# Patient Record
Sex: FEMALE | Race: BLACK OR AFRICAN AMERICAN | NOT HISPANIC OR LATINO | Employment: FULL TIME | URBAN - METROPOLITAN AREA
[De-identification: names, ages, dates, MRNs, and addresses within clinical notes are randomized per-mention and may not be internally consistent; named-entity substitution may affect disease eponyms.]

---

## 2023-09-15 ENCOUNTER — LAB (OUTPATIENT)
Dept: LAB | Facility: HOSPITAL | Age: 43
End: 2023-09-15
Payer: MEDICAID

## 2023-09-15 ENCOUNTER — OFFICE VISIT (OUTPATIENT)
Dept: FAMILY MEDICINE CLINIC | Facility: CLINIC | Age: 43
End: 2023-09-15
Payer: MEDICAID

## 2023-09-15 VITALS
OXYGEN SATURATION: 98 % | WEIGHT: 191 LBS | BODY MASS INDEX: 28.95 KG/M2 | HEART RATE: 64 BPM | SYSTOLIC BLOOD PRESSURE: 108 MMHG | DIASTOLIC BLOOD PRESSURE: 62 MMHG | HEIGHT: 68 IN

## 2023-09-15 DIAGNOSIS — Z13.0 SCREENING FOR DEFICIENCY ANEMIA: ICD-10-CM

## 2023-09-15 DIAGNOSIS — Z00.00 PHYSICAL EXAM, ANNUAL: Primary | ICD-10-CM

## 2023-09-15 DIAGNOSIS — Z13.1 SCREENING FOR DIABETES MELLITUS: ICD-10-CM

## 2023-09-15 DIAGNOSIS — Z00.00 PHYSICAL EXAM, ANNUAL: ICD-10-CM

## 2023-09-15 DIAGNOSIS — Z13.220 SCREENING FOR CHOLESTEROL LEVEL: ICD-10-CM

## 2023-09-15 DIAGNOSIS — Z13.29 SCREENING FOR THYROID DISORDER: ICD-10-CM

## 2023-09-15 LAB
ALBUMIN SERPL-MCNC: 4.6 G/DL (ref 3.5–5.2)
ALBUMIN/GLOB SERPL: 1.9 G/DL
ALP SERPL-CCNC: 57 U/L (ref 39–117)
ALT SERPL W P-5'-P-CCNC: 13 U/L (ref 1–33)
ANION GAP SERPL CALCULATED.3IONS-SCNC: 9 MMOL/L (ref 5–15)
AST SERPL-CCNC: 17 U/L (ref 1–32)
BASOPHILS # BLD AUTO: 0.03 10*3/MM3 (ref 0–0.2)
BASOPHILS NFR BLD AUTO: 0.7 % (ref 0–1.5)
BILIRUB SERPL-MCNC: 0.4 MG/DL (ref 0–1.2)
BUN SERPL-MCNC: 11 MG/DL (ref 6–20)
BUN/CREAT SERPL: 13.8 (ref 7–25)
CALCIUM SPEC-SCNC: 9.3 MG/DL (ref 8.6–10.5)
CHLORIDE SERPL-SCNC: 105 MMOL/L (ref 98–107)
CHOLEST SERPL-MCNC: 162 MG/DL (ref 0–200)
CO2 SERPL-SCNC: 25 MMOL/L (ref 22–29)
CREAT SERPL-MCNC: 0.8 MG/DL (ref 0.57–1)
DEPRECATED RDW RBC AUTO: 41.8 FL (ref 37–54)
EGFRCR SERPLBLD CKD-EPI 2021: 93.3 ML/MIN/1.73
EOSINOPHIL # BLD AUTO: 0.14 10*3/MM3 (ref 0–0.4)
EOSINOPHIL NFR BLD AUTO: 3 % (ref 0.3–6.2)
ERYTHROCYTE [DISTWIDTH] IN BLOOD BY AUTOMATED COUNT: 13 % (ref 12.3–15.4)
GLOBULIN UR ELPH-MCNC: 2.4 GM/DL
GLUCOSE SERPL-MCNC: 93 MG/DL (ref 65–99)
HCT VFR BLD AUTO: 37.8 % (ref 34–46.6)
HDLC SERPL-MCNC: 47 MG/DL (ref 40–60)
HGB BLD-MCNC: 12.8 G/DL (ref 12–15.9)
IMM GRANULOCYTES # BLD AUTO: 0 10*3/MM3 (ref 0–0.05)
IMM GRANULOCYTES NFR BLD AUTO: 0 % (ref 0–0.5)
LDLC SERPL CALC-MCNC: 106 MG/DL (ref 0–100)
LDLC/HDLC SERPL: 2.26 {RATIO}
LYMPHOCYTES # BLD AUTO: 2.5 10*3/MM3 (ref 0.7–3.1)
LYMPHOCYTES NFR BLD AUTO: 54.3 % (ref 19.6–45.3)
MCH RBC QN AUTO: 29.9 PG (ref 26.6–33)
MCHC RBC AUTO-ENTMCNC: 33.9 G/DL (ref 31.5–35.7)
MCV RBC AUTO: 88.3 FL (ref 79–97)
MONOCYTES # BLD AUTO: 0.47 10*3/MM3 (ref 0.1–0.9)
MONOCYTES NFR BLD AUTO: 10.2 % (ref 5–12)
NEUTROPHILS NFR BLD AUTO: 1.46 10*3/MM3 (ref 1.7–7)
NEUTROPHILS NFR BLD AUTO: 31.8 % (ref 42.7–76)
NRBC BLD AUTO-RTO: 0.2 /100 WBC (ref 0–0.2)
PLATELET # BLD AUTO: 200 10*3/MM3 (ref 140–450)
PMV BLD AUTO: 11.7 FL (ref 6–12)
POTASSIUM SERPL-SCNC: 4.4 MMOL/L (ref 3.5–5.2)
PROT SERPL-MCNC: 7 G/DL (ref 6–8.5)
RBC # BLD AUTO: 4.28 10*6/MM3 (ref 3.77–5.28)
SODIUM SERPL-SCNC: 139 MMOL/L (ref 136–145)
TRIGL SERPL-MCNC: 44 MG/DL (ref 0–150)
TSH SERPL DL<=0.05 MIU/L-ACNC: 2.95 UIU/ML (ref 0.27–4.2)
VLDLC SERPL-MCNC: 9 MG/DL (ref 5–40)
WBC NRBC COR # BLD: 4.6 10*3/MM3 (ref 3.4–10.8)

## 2023-09-15 PROCEDURE — 1160F RVW MEDS BY RX/DR IN RCRD: CPT | Performed by: PHYSICIAN ASSISTANT

## 2023-09-15 PROCEDURE — 1159F MED LIST DOCD IN RCRD: CPT | Performed by: PHYSICIAN ASSISTANT

## 2023-09-15 PROCEDURE — 80061 LIPID PANEL: CPT

## 2023-09-15 PROCEDURE — 99396 PREV VISIT EST AGE 40-64: CPT | Performed by: PHYSICIAN ASSISTANT

## 2023-09-15 PROCEDURE — 80050 GENERAL HEALTH PANEL: CPT

## 2023-09-15 NOTE — PROGRESS NOTES
Chief Complaint   Patient presents with    Annual Exam       Lupe Herman is a pleasant 43 y.o. female who is here for annual physical exam.  Patient denies any concerns or complaints today.  She is not on any medications currently.  Takes prenatal vitamin.  She refuses pap smear, mammogram or colonoscopy.  She denies any moles or lesions of concern.  Denies vision issues.  Not going to dentist, brushing teeth regularly.     is present at appointment.      History reviewed. No pertinent past medical history.    History reviewed. No pertinent surgical history.    History reviewed. No pertinent family history.    Social History     Socioeconomic History    Marital status:    Tobacco Use    Smoking status: Never    Smokeless tobacco: Never   Vaping Use    Vaping Use: Never used   Substance and Sexual Activity    Alcohol use: Yes     Alcohol/week: 1.0 standard drink     Types: 1 Glasses of wine per week    Drug use: Never    Sexual activity: Yes     Partners: Male       No Known Allergies    ROS  Review of Systems   Constitutional:  Positive for fatigue. Negative for chills, diaphoresis and fever.   HENT:  Negative for congestion, ear pain, hearing loss, postnasal drip, rhinorrhea and sore throat.    Eyes:  Negative for blurred vision and pain.   Respiratory:  Negative for cough, shortness of breath and wheezing.    Cardiovascular:  Negative for chest pain and leg swelling.   Gastrointestinal:  Negative for abdominal pain, blood in stool, constipation, diarrhea, nausea, vomiting and indigestion.   Endocrine: Negative for polyuria.   Genitourinary:  Negative for dysuria, flank pain and hematuria.   Musculoskeletal:  Negative for arthralgias, gait problem and myalgias.   Skin:  Negative for rash and skin lesions.   Neurological:  Negative for dizziness and headache.   Psychiatric/Behavioral:  Negative for self-injury, sleep disturbance, suicidal ideas, depressed mood and stress. The patient is not  "nervous/anxious.      Vitals:    09/15/23 1047   BP: 108/62   Pulse: 64   SpO2: 98%   Weight: 86.6 kg (191 lb)   Height: 172.7 cm (67.99\")     Body mass index is 29.05 kg/m².    BMI is >= 25 and <30. (Overweight) The following options were offered after discussion;: exercise counseling/recommendations and nutrition counseling/recommendations       Current Outpatient Medications on File Prior to Visit   Medication Sig Dispense Refill    acetaminophen (TYLENOL) 500 MG tablet Take 2 tablets by mouth Every 8 (Eight) Hours As Needed for Moderate Pain. 180 tablet 1    ibuprofen (ADVIL,MOTRIN) 800 MG tablet Take 1 tablet by mouth Every 8 (Eight) Hours As Needed for Moderate Pain. 90 tablet 0     No current facility-administered medications on file prior to visit.       Results for orders placed or performed in visit on 11/21/22   NuSwab VG+ - Swab, Vagina    Specimen: Vagina; Swab   Result Value Ref Range    Atopobium Vaginae Low - 0 Score    BVAB 2 Low - 0 Score    Megasphaera 1 Low - 0 Score    Candida Albicans, CARRIE Negative Negative    Candida Glabrata, CARRIE Negative Negative    Trichomonas vaginosis Negative Negative    Chlamydia trachomatis, CARRIE Negative Negative    Neisseria gonorrhoeae, CARRIE Negative Negative   POCT SARS-CoV-2 Antigen BRIAN + Flu    Specimen: Swab   Result Value Ref Range    SARS Antigen Not Detected Not Detected, Presumptive Negative    Influenza A Antigen BRIAN Not Detected Not Detected    Influenza B Antigen BRIAN Not Detected Not Detected    Internal Control Passed Passed    Lot Number [id number]     Expiration Date 06/01/23        PE    Physical Exam  Vitals reviewed.   Constitutional:       General: She is not in acute distress.     Appearance: Normal appearance. She is well-developed and normal weight. She is not ill-appearing or diaphoretic.   HENT:      Head: Normocephalic and atraumatic.      Right Ear: Hearing, tympanic membrane, ear canal and external ear normal.      Left Ear: Hearing, tympanic " membrane, ear canal and external ear normal.      Nose: Nose normal.      Right Sinus: No maxillary sinus tenderness or frontal sinus tenderness.      Left Sinus: No maxillary sinus tenderness or frontal sinus tenderness.      Mouth/Throat:      Pharynx: Uvula midline.   Eyes:      General: Lids are normal.      Extraocular Movements: Extraocular movements intact.      Conjunctiva/sclera: Conjunctivae normal.   Neck:      Thyroid: No thyroid mass or thyromegaly.      Trachea: Trachea and phonation normal.   Cardiovascular:      Rate and Rhythm: Normal rate and regular rhythm.      Heart sounds: Normal heart sounds.   Pulmonary:      Effort: Pulmonary effort is normal.      Breath sounds: Normal breath sounds.   Abdominal:      General: Bowel sounds are normal. There is no distension.      Palpations: Abdomen is soft. Abdomen is not rigid.      Tenderness: There is no abdominal tenderness. There is no guarding.   Musculoskeletal:         General: Normal range of motion.      Cervical back: Normal range of motion.      Right lower leg: No edema.      Left lower leg: No edema.   Lymphadenopathy:      Cervical: No cervical adenopathy.      Right cervical: No superficial cervical adenopathy.     Left cervical: No superficial cervical adenopathy.   Skin:     General: Skin is warm.      Findings: No erythema or rash.      Nails: There is no clubbing.   Neurological:      Mental Status: She is alert and oriented to person, place, and time.      Coordination: Coordination normal.      Gait: Gait normal.      Deep Tendon Reflexes: Reflexes are normal and symmetric.      Comments: CN grossly intact   Psychiatric:         Attention and Perception: Attention and perception normal. She is attentive.         Mood and Affect: Mood and affect normal.         Speech: Speech normal.         Behavior: Behavior normal. Behavior is cooperative.         Thought Content: Thought content normal.         Cognition and Memory: Cognition and  memory normal.         Judgment: Judgment normal.       A/P    Diagnoses and all orders for this visit:    1. Physical exam, annual (Primary)  -     CBC Auto Differential; Future  -     Comprehensive Metabolic Panel; Future  -     TSH Rfx On Abnormal To Free T4; Future  -     Lipid Panel; Future  PE completed  Preventative labs ordered  Colonoscopy - refused  Mammogram - refused  Gynecologist - pap smear refused  Dentist - not going, brushes teeth at home  Ophthalmologist - denies vision issues  Dermatologist - denies moles or lesions of concern  Vaccinations discussed    2. Screening for deficiency anemia  -     CBC Auto Differential; Future    3. Screening for diabetes mellitus  -     Comprehensive Metabolic Panel; Future    4. Screening for thyroid disorder  -     TSH Rfx On Abnormal To Free T4; Future    5. Screening for cholesterol level  -     Lipid Panel; Future         Plan of care reviewed with patient at the conclusion of today's visit. Education was provided regarding nutrition , exercise, supplements, preventative screenings, and vaccinations diagnosis, management and any prescribed or recommended OTC medications.  Patient verbalizes understanding of and agreement with management plan.    Dictated Utilizing Dragon Dictation     Please note that portions of this note were completed with a voice recognition program.     Part of this note may be an electronic transcription/translation of spoken language to printed text using the Dragon Dictation System.    Return in about 1 year (around 9/15/2024) for Annual physical.     Ava Oliver PA-C

## 2024-03-28 ENCOUNTER — LAB (OUTPATIENT)
Dept: LAB | Facility: HOSPITAL | Age: 44
End: 2024-03-28
Payer: MEDICAID

## 2024-03-28 ENCOUNTER — OFFICE VISIT (OUTPATIENT)
Dept: FAMILY MEDICINE CLINIC | Facility: CLINIC | Age: 44
End: 2024-03-28
Payer: MEDICAID

## 2024-03-28 VITALS
OXYGEN SATURATION: 100 % | HEART RATE: 74 BPM | WEIGHT: 197.4 LBS | DIASTOLIC BLOOD PRESSURE: 62 MMHG | HEIGHT: 68 IN | BODY MASS INDEX: 29.92 KG/M2 | SYSTOLIC BLOOD PRESSURE: 104 MMHG

## 2024-03-28 DIAGNOSIS — R11.0 NAUSEA: ICD-10-CM

## 2024-03-28 DIAGNOSIS — Z34.90 PREGNANCY, UNSPECIFIED GESTATIONAL AGE: ICD-10-CM

## 2024-03-28 DIAGNOSIS — Z32.00 POSSIBLE PREGNANCY: Primary | ICD-10-CM

## 2024-03-28 LAB
B-HCG UR QL: POSITIVE
EXPIRATION DATE: ABNORMAL
HCG INTACT+B SERPL-ACNC: NORMAL MIU/ML
INTERNAL NEGATIVE CONTROL: ABNORMAL
INTERNAL POSITIVE CONTROL: ABNORMAL
Lab: ABNORMAL

## 2024-03-28 PROCEDURE — 36415 COLL VENOUS BLD VENIPUNCTURE: CPT

## 2024-03-28 PROCEDURE — 84702 CHORIONIC GONADOTROPIN TEST: CPT

## 2024-03-28 NOTE — PROGRESS NOTES
"Manda Schaefer  1980  [id number]  Patient Care Team:  Pramod Dorantes MD as PCP - General (Internal Medicine)    Manda Schaefer is a 43 y.o. female here today for follow up.     This patient is accompanied by their  who contributes to the history of their care.    Chief Complaint:    Chief Complaint   Patient presents with    Nausea     AT home pregnancy test came back positive    Fatigue    Possible Pregnancy        History of Present Illness:  I have reviewed and/or updated the patient's past medical, past surgical, family, social history, problem list and allergies as appropriate.     Her to transfer care. Her LMP was Feb 7th. She reports nausea but no vomiting.  Took a pregnancy test at home and it was positive.  Denies any cramping or bleeding , fevers or chills. She denies any abdominal pain. She has 4 previous pregnancies with normal delivery.this would be her 5th pregnancy- no miscarriages. Her last delivey was March 25 2018- ( [place])- she was followed at hat time by ob. She would like to be referred to OB. She currntly takes no medications.  She indicates her nausea is manageable.      Review of Systems   Constitutional:  Positive for fatigue.   Eyes: Negative.    Respiratory: Negative.     Cardiovascular: Negative.    Gastrointestinal:  Positive for nausea.   Genitourinary:  Positive for amenorrhea.   Musculoskeletal: Negative.    Skin: Negative.        Vitals:    03/28/24 0922   BP: 104/62   Pulse: 74   SpO2: 100%   Weight: 89.5 kg (197 lb 6.4 oz)   Height: 172.7 cm (67.99\")     Body mass index is 30.02 kg/m².    Physical Exam  Vitals and nursing note reviewed.   Constitutional:       General: She is not in acute distress.     Appearance: She is well-developed. She is not diaphoretic.   HENT:      Head: Normocephalic and atraumatic.      Right Ear: External ear normal.      Left Ear: External ear normal.      Mouth/Throat:      Pharynx: No oropharyngeal exudate.   Eyes:      General: No " scleral icterus.        Right eye: No discharge.      Conjunctiva/sclera: Conjunctivae normal.   Neck:      Thyroid: No thyromegaly.      Vascular: No JVD.      Trachea: No tracheal deviation.   Cardiovascular:      Rate and Rhythm: Normal rate and regular rhythm.      Heart sounds: Normal heart sounds.      Comments: PMI nondisplaced  Pulmonary:      Effort: Pulmonary effort is normal.      Breath sounds: Normal breath sounds. No wheezing or rales.   Abdominal:      General: Bowel sounds are normal.      Palpations: Abdomen is soft.      Tenderness: There is no abdominal tenderness. There is no guarding or rebound.   Musculoskeletal:      Cervical back: Normal range of motion and neck supple.   Lymphadenopathy:      Cervical: No cervical adenopathy.   Skin:     General: Skin is warm and dry.      Capillary Refill: Capillary refill takes less than 2 seconds.      Coloration: Skin is not pale.      Findings: No rash.   Neurological:      Mental Status: She is alert and oriented to person, place, and time.      Motor: No abnormal muscle tone.      Coordination: Coordination normal.   Psychiatric:         Judgment: Judgment normal.         Procedures    Results Review:    I reviewed the patient's new clinical results.  f Range & Units 09:42  (3/28/24) 1 yr ago  (11/21/22) 1 yr ago  (4/15/22) 1 yr ago  (4/15/22)   HCG, Urine, QL  Negative Positive Abnormal    Negative   Lot Number [id number]   Internal Positive Control  Positive, Passed Passed   Positive   Internal Negative Control  Negative, Passed Passed   Negative   Expiration Date 03/11/2025 06/01/23       Assessment/Plan:    Problem List Items Addressed This Visit    None  Visit Diagnoses       Possible pregnancy    -  Primary    Relevant Orders    POC Pregnancy, Urine (Completed)    Nausea        Relevant Orders    hCG, Quantitative, Pregnancy    Pregnancy, unspecified gestational age        Relevant Orders    hCG, Quantitative,  Pregnancy    Ambulatory Referral to Obstetrics / Gynecology        I have referred her for first available OB for likely high risk given patient age 43 for longitudinal care, requests beta-hCG as well.  Avoid any over-the-counter medicines.    Plan of care reviewed with patient at the conclusion of today's visit. Education was provided regarding diagnosis and management.  Patient verbalizes understanding of and agreement with management plan.    No follow-ups on file.    Pramod Dorantes MD      Please note than portions of this note were completed wt a Voice Recognition Program

## 2024-04-27 ENCOUNTER — APPOINTMENT (OUTPATIENT)
Dept: ULTRASOUND IMAGING | Facility: HOSPITAL | Age: 44
End: 2024-04-27
Payer: MEDICAID

## 2024-04-27 ENCOUNTER — HOSPITAL ENCOUNTER (EMERGENCY)
Facility: HOSPITAL | Age: 44
Discharge: HOME | End: 2024-04-27
Attending: EMERGENCY MEDICINE
Payer: MEDICAID

## 2024-04-27 VITALS
OXYGEN SATURATION: 100 % | DIASTOLIC BLOOD PRESSURE: 86 MMHG | TEMPERATURE: 99 F | WEIGHT: 197.31 LBS | SYSTOLIC BLOOD PRESSURE: 102 MMHG | HEIGHT: 68 IN | HEART RATE: 70 BPM | RESPIRATION RATE: 16 BRPM | BODY MASS INDEX: 29.9 KG/M2

## 2024-04-27 DIAGNOSIS — O20.8 SUBCHORIONIC HEMORRHAGE OF PLACENTA IN FIRST TRIMESTER: ICD-10-CM

## 2024-04-27 DIAGNOSIS — Z3A.12 12 WEEKS GESTATION OF PREGNANCY: Primary | ICD-10-CM

## 2024-04-27 LAB
ABO GROUP BLD: NORMAL
BASOPHILS # BLD AUTO: 0.02 10*3/MM3 (ref 0–0.2)
BASOPHILS NFR BLD AUTO: 0.4 % (ref 0–1.5)
DEPRECATED RDW RBC AUTO: 38.8 FL (ref 37–54)
EOSINOPHIL # BLD AUTO: 0.14 10*3/MM3 (ref 0–0.4)
EOSINOPHIL NFR BLD AUTO: 3 % (ref 0.3–6.2)
ERYTHROCYTE [DISTWIDTH] IN BLOOD BY AUTOMATED COUNT: 12.1 % (ref 12.3–15.4)
HCG INTACT+B SERPL-ACNC: NORMAL MIU/ML
HCT VFR BLD AUTO: 33.8 % (ref 34–46.6)
HGB BLD-MCNC: 11.3 G/DL (ref 12–15.9)
HOLD SPECIMEN: NORMAL
IMM GRANULOCYTES # BLD AUTO: 0.01 10*3/MM3 (ref 0–0.05)
IMM GRANULOCYTES NFR BLD AUTO: 0.2 % (ref 0–0.5)
LYMPHOCYTES # BLD AUTO: 1.49 10*3/MM3 (ref 0.7–3.1)
LYMPHOCYTES NFR BLD AUTO: 31.6 % (ref 19.6–45.3)
MCH RBC QN AUTO: 29.4 PG (ref 26.6–33)
MCHC RBC AUTO-ENTMCNC: 33.4 G/DL (ref 31.5–35.7)
MCV RBC AUTO: 88 FL (ref 79–97)
MONOCYTES # BLD AUTO: 0.42 10*3/MM3 (ref 0.1–0.9)
MONOCYTES NFR BLD AUTO: 8.9 % (ref 5–12)
NEUTROPHILS NFR BLD AUTO: 2.63 10*3/MM3 (ref 1.7–7)
NEUTROPHILS NFR BLD AUTO: 55.9 % (ref 42.7–76)
NRBC BLD AUTO-RTO: 0 /100 WBC (ref 0–0.2)
NUMBER OF DOSES: NORMAL
PLATELET # BLD AUTO: 156 10*3/MM3 (ref 140–450)
PMV BLD AUTO: 11.2 FL (ref 6–12)
RBC # BLD AUTO: 3.84 10*6/MM3 (ref 3.77–5.28)
RH BLD: POSITIVE
WBC NRBC COR # BLD AUTO: 4.71 10*3/MM3 (ref 3.4–10.8)
WHOLE BLOOD HOLD COAG: NORMAL
WHOLE BLOOD HOLD SPECIMEN: NORMAL

## 2024-04-27 PROCEDURE — 86901 BLOOD TYPING SEROLOGIC RH(D): CPT | Performed by: EMERGENCY MEDICINE

## 2024-04-27 PROCEDURE — 99284 EMERGENCY DEPT VISIT MOD MDM: CPT

## 2024-04-27 PROCEDURE — 84702 CHORIONIC GONADOTROPIN TEST: CPT | Performed by: EMERGENCY MEDICINE

## 2024-04-27 PROCEDURE — 86900 BLOOD TYPING SEROLOGIC ABO: CPT | Performed by: EMERGENCY MEDICINE

## 2024-04-27 PROCEDURE — 76817 TRANSVAGINAL US OBSTETRIC: CPT

## 2024-04-27 PROCEDURE — 36415 COLL VENOUS BLD VENIPUNCTURE: CPT

## 2024-04-27 PROCEDURE — 85025 COMPLETE CBC W/AUTO DIFF WBC: CPT | Performed by: EMERGENCY MEDICINE

## 2024-04-27 RX ORDER — SODIUM CHLORIDE 0.9 % (FLUSH) 0.9 %
10 SYRINGE (ML) INJECTION AS NEEDED
Status: DISCONTINUED | OUTPATIENT
Start: 2024-04-27 | End: 2024-04-27 | Stop reason: HOSPADM

## 2024-04-27 NOTE — ED PROVIDER NOTES
EMERGENCY DEPARTMENT ENCOUNTER    Pt Name: Manda Schaefer  MRN: [id number]  Pt :   1980  Room Number:  RW4/R4  Date of encounter:  2024  PCP: Pramod Dorantes MD  ED Provider: HANG Gannon    Historian: Patient    HPI:  Chief Complaint: Vaginal bleeding early pregnancy    Context: Manda Schaefer is a 44 y.o. female who presents to the ED c/o vaginal bleeding early pregnancy patient is approximately 6 weeks pregnant.  She is a G5, P4.  She reports approximately 4 hours ago she began to have vaginal bleeding.  She is followed by Dr. Block.  She denies any abdominal pain, cramping.  Negative for nausea, vomiting.  HPI     REVIEW OF SYSTEMS  A chief complaint appropriate review of systems was completed and is negative except as noted in the HPI.     PAST MEDICAL HISTORY  History reviewed. No pertinent past medical history.    PAST SURGICAL HISTORY  History reviewed. No pertinent surgical history.    FAMILY HISTORY  History reviewed. No pertinent family history.    SOCIAL HISTORY  Social History     Socioeconomic History    Marital status:    Tobacco Use    Smoking status: Never    Smokeless tobacco: Never   Vaping Use    Vaping status: Never Used   Substance and Sexual Activity    Alcohol use: Yes     Alcohol/week: 1.0 standard drink of alcohol     Types: 1 Glasses of wine per week    Drug use: Never    Sexual activity: Yes     Partners: Male       ALLERGIES  Patient has no known allergies.    PHYSICAL EXAM  Physical Exam  Vitals and nursing note reviewed.   Constitutional:       General: She is not in acute distress.     Appearance: Normal appearance. She is not ill-appearing.   HENT:      Head: Normocephalic and atraumatic.      Nose: Nose normal.      Mouth/Throat:      Mouth: Mucous membranes are moist.   Eyes:      Extraocular Movements: Extraocular movements intact.      Pupils: Pupils are equal, round, and reactive to light.   Cardiovascular:      Rate and Rhythm: Normal rate and  regular rhythm.      Pulses: Normal pulses.      Heart sounds: Normal heart sounds.   Pulmonary:      Effort: Pulmonary effort is normal.      Breath sounds: Normal breath sounds.   Abdominal:      General: Bowel sounds are normal. There is no distension.      Tenderness: There is no abdominal tenderness.   Musculoskeletal:         General: Normal range of motion.      Cervical back: Normal range of motion and neck supple.   Skin:     General: Skin is warm and dry.   Neurological:      General: No focal deficit present.      Mental Status: She is alert and oriented to person, place, and time.   Psychiatric:         Mood and Affect: Mood normal.         Behavior: Behavior normal.           LAB RESULTS  Results for orders placed or performed during the hospital encounter of 24   hCG, Quantitative, Pregnancy    Specimen: Blood   Result Value Ref Range    HCG Quantitative 65,548.00 mIU/mL   CBC Auto Differential    Specimen: Blood   Result Value Ref Range    WBC 4.71 3.40 - 10.80 10*3/mm3    RBC 3.84 3.77 - 5.28 10*6/mm3    Hemoglobin 11.3 (L) 12.0 - 15.9 g/dL    Hematocrit 33.8 (L) 34.0 - 46.6 %    MCV 88.0 79.0 - 97.0 fL    MCH 29.4 26.6 - 33.0 pg    MCHC 33.4 31.5 - 35.7 g/dL    RDW 12.1 (L) 12.3 - 15.4 %    RDW-SD 38.8 37.0 - 54.0 fl    MPV 11.2 6.0 - 12.0 fL    Platelets 156 140 - 450 10*3/mm3    Neutrophil % 55.9 42.7 - 76.0 %    Lymphocyte % 31.6 19.6 - 45.3 %    Monocyte % 8.9 5.0 - 12.0 %    Eosinophil % 3.0 0.3 - 6.2 %    Basophil % 0.4 0.0 - 1.5 %    Immature Grans % 0.2 0.0 - 0.5 %    Neutrophils, Absolute 2.63 1.70 - 7.00 10*3/mm3    Lymphocytes, Absolute 1.49 0.70 - 3.10 10*3/mm3    Monocytes, Absolute 0.42 0.10 - 0.90 10*3/mm3    Eosinophils, Absolute 0.14 0.00 - 0.40 10*3/mm3    Basophils, Absolute 0.02 0.00 - 0.20 10*3/mm3    Immature Grans, Absolute 0.01 0.00 - 0.05 10*3/mm3    nRBC 0.0 0.0 - 0.2 /100 WBC    RhIg Evaluation    Specimen: Blood   Result Value Ref Range    ABO Type B     RH  type Positive    Doses of Rh Immune Globulin    Specimen: Blood   Result Value Ref Range    Number of Doses       RhIg is not indicated due to the patient's Rh status   Green Top (Gel)   Result Value Ref Range    Extra Tube Hold for add-ons.    Lavender Top   Result Value Ref Range    Extra Tube hold for add-on    Gold Top - SST   Result Value Ref Range    Extra Tube Hold for add-ons.    Gray Top   Result Value Ref Range    Extra Tube Hold for add-ons.    Light Blue Top   Result Value Ref Range    Extra Tube Hold for add-ons.        If labs were ordered, I independently reviewed the results and considered them in treating the patient.    RADIOLOGY  US Ob Transvaginal   Final Result   Impression:   1.Single intrauterine pregnancy with heart rate of 153 bpm.   2.Suspected small subchorionic hemorrhage. No other definite acute complication was seen.   3.Ovaries could not be visualized due to bowel gas.            Electronically Signed: Frankie Phoenix     4/27/2024 4:23 PM EDT     Workstation ID: [id number]        [x] Radiologist's Report Reviewed:  I ordered and independently interpreted the above noted radiographic studies.  See radiologist's dictation for official interpretation.      PROCEDURES    Procedures    No orders to display       MEDICATIONS GIVEN IN ER    Medications   sodium chloride 0.9 % flush 10 mL (has no administration in time range)       MEDICAL DECISION MAKING, PROGRESS, and CONSULTS   Medical Decision Making  Manda Schaefer is a 44 y.o. female who presents to the ED c/o vaginal bleeding early pregnancy patient is approximately 6 weeks pregnant.  She is a G5, P4.  She reports approximately 4 hours ago she began to have vaginal bleeding.  She is followed by Dr. Block.  She denies any abdominal pain, cramping.  Negative for nausea, vomiting.      Problems Addressed:  12 weeks gestation of pregnancy: complicated acute illness or injury     Details: Imaging reveals an IUP of 12 weeks and 3 days.  It also  reveals a subchorionic hemorrhage.  Patient has an appointment to see Dr. Block.  Patient and I discussed pelvic rest and follow-up.  Subchorionic hemorrhage of placenta in first trimester: complicated acute illness or injury    Amount and/or Complexity of Data Reviewed  Labs: ordered. Decision-making details documented in ED Course.  Radiology: ordered.    Risk  Prescription drug management.        All labs have been independently reviewed by me.  All radiology studies have been interpreted by me and the radiologist dictating the report.  All EKG's have been independently interpreted by me as well as and overseeing attending physician.    [] Discussed with radiology regarding test interpretation:    Discussion below represents my analysis of pertinent findings related to patient's condition, differential diagnosis, treatment plan and final disposition.    Differential diagnosis:  The differential diagnosis associated with the patient's presentation includes: Miscarriage, threatened miscarriage, subchorionic hemorrhage    Additional sources  Discussed/ obtained information from independent historians:   [x] Spouse  [] Parent  [] Family member  [] Friend  [] EMS   [] Other:  External (non-ED) record review:   [] Inpatient record:   [] Office record:   [] Outpatient record:   [x] Prior Outpatient labs:   [x] Prior Outpatient radiology:   [] Primary Care record:   [] Outside ED record:   [] Other:   Patient's care impacted by:   [] Diabetes  [] Hypertension  [] Hyperlipidemia  [] Hypothyroidism   [] Coronary Artery Disease   [] COPD   [] Cancer   [] Obesity  [] GERD   [] Tobacco Abuse   [] Substance Abuse    [] Anxiety   [] Depression   [x] Other: G5, P4  Care significantly affected by Social Determinants of Health (housing and economic circumstances, unemployment)    [] Yes     [x] No   If yes, Patient's care significantly limited by  Social Determinants of Health including:   [] Inadequate housing   [] Low  income   [] Alcoholism and drug addiction in family   [] Problems related to primary support group   [] Unemployment   [] Problems related to employment   [] Other Social Determinants of Health:     Shared decision making: Shared decision making with patient and spouse we will discharge the family home.  Patient to follow-up with OB/GYN as planned.  Patient to return to the ER as needed.  Patient agrees and verbalized understanding.    Orders placed during this visit:  Orders Placed This Encounter   Procedures    US Ob Transvaginal    [place] Draw    hCG, Quantitative, Pregnancy    CBC Auto Differential    NPO Diet NPO Type: Strict NPO    Undress & Gown    Vital Signs    Orthostatic Blood Pressure    Supplies To Bedside - Notify MD When Ready- Pelvic Cart / Set Up    Pulse Oximetry    Oxygen Therapy- Nasal Cannula; Titrate 1-6 LPM Per SpO2; 90 - 95%     RhIg Evaluation    Doses of Rh Immune Globulin    Insert Peripheral IV    CBC & Differential    Green Top (Gel)    Lavender Top    Gold Top - SST    Gray Top    Light Blue Top       I considered prescription management  with:   [] Pain medication  [] Antiviral  [] Antibiotic   [] Other:   Rationale:  Additional orders considered but not ordered:  The following testing was considered but ultimately not selected after discussion with patient/family:    ED Course:    ED Course as of 24 1707   Sat 2024   1501 Number of Doses: RhIg is not indicated due to the patient's Rh status [KG]   1501 WBC: 4.71 [KG]   1501 RBC: 3.84 [KG]   1501 Hemoglobin(!): 11.3 [KG]   1501 Hematocrit(!): 33.8 [KG]   1548 HCG Quantitative: 65,548.00 [KG]   1630 HCG Quantitative: 65,548.00 [KG]   1631 Impression:  1.Single intrauterine pregnancy with heart rate of 153 bpm.  2.Suspected small subchorionic hemorrhage. No other definite acute complication was seen.  3.Ovaries could not be visualized due to bowel gas.   [KG]      ED Course User Index  [KG] Senia Mahoney,  APRN            DIAGNOSIS  Final diagnoses:   12 weeks gestation of pregnancy   Subchorionic hemorrhage of placenta in first trimester       DISPOSITION    DISCHARGE    Patient discharged in stable condition.    Reviewed implications of results, diagnosis, meds, responsibility to follow up, warning signs and symptoms of possible worsening, potential complications and reasons to return to ER.    Patient/Family voiced understanding of above instructions.    Discussed plan for discharge, as there is no emergent indication for admission.  Pt/family is agreeable and understands need for follow up and possible repeat testing.  Pt/family is aware that discharge does not mean that nothing is wrong but that it indicates no emergency is currently present that requires admission and they must continue care with follow-up as given below or with a physician of their choice.     FOLLOW-UP  Pramod Dorantes MD  [address]  [place] [address]  [phone]          Jose De Jesus Block MD  [address]  [address]  [place] [id number]  [phone]               Medication List      No changes were made to your prescriptions during this visit.          ED Disposition       ED Disposition   Discharge    Condition   Stable    Comment   --               Please note that portions of this document were completed with voice recognition software.       Senia Mahoney, APRN  04/27/24 1706

## 2024-04-27 NOTE — Clinical Note
[facility] EMERGENCY DEPARTMENT  [address]  [facility] [address]  Phone: [phone]    Manda Schaefer was seen and treated in our emergency department on 4/27/2024.  She may return to work on 04/28/2024.  Patient to return on light duty until following up with OB/GYN.  Patient has a subchorionic hemorrhage       Thank you for choosing [facility].    Senia Mahoney, HANG

## 2024-04-29 ENCOUNTER — PATIENT ROUNDING (BHMG ONLY) (OUTPATIENT)
Dept: URGENT CARE | Facility: CLINIC | Age: 44
End: 2024-04-29
Payer: MEDICAID

## 2024-04-29 NOTE — ED NOTES
Thank you for letting us care for you in your recent visit to our urgent care center. We would love to hear about your experience with us. Was this the first time you have visited our location?    We’re always looking for ways to make our patients’ experiences even better. Do you have any recommendations on ways we may improve?     I appreciate you taking the time to respond. Please be on the lookout for a survey about your recent visit from [facility] via text or email. We would greatly appreciate if you could fill that out and turn it back in. We want your voice to be heard and we value your feedback.   Thank you for choosing [facility] for your healthcare needs.

## 2024-05-10 ENCOUNTER — LAB (OUTPATIENT)
Dept: LAB | Facility: HOSPITAL | Age: 44
End: 2024-05-10
Payer: MEDICAID

## 2024-05-10 ENCOUNTER — INITIAL PRENATAL (OUTPATIENT)
Dept: OBSTETRICS AND GYNECOLOGY | Facility: CLINIC | Age: 44
End: 2024-05-10
Payer: MEDICAID

## 2024-05-10 VITALS — SYSTOLIC BLOOD PRESSURE: 120 MMHG | DIASTOLIC BLOOD PRESSURE: 72 MMHG | WEIGHT: 196 LBS | BODY MASS INDEX: 29.81 KG/M2

## 2024-05-10 DIAGNOSIS — O09.529 ANTEPARTUM MULTIGRAVIDA OF ADVANCED MATERNAL AGE: ICD-10-CM

## 2024-05-10 DIAGNOSIS — O09.40 ENCOUNTER FOR SUPERVISION OF HIGH RISK PREGNANCY WITH GRAND MULTIPARITY, ANTEPARTUM: ICD-10-CM

## 2024-05-10 DIAGNOSIS — O09.40 ENCOUNTER FOR SUPERVISION OF HIGH RISK PREGNANCY WITH GRAND MULTIPARITY, ANTEPARTUM: Primary | ICD-10-CM

## 2024-05-10 DIAGNOSIS — Z60.3 LANGUAGE BARRIER, CULTURAL DIFFERENCES: ICD-10-CM

## 2024-05-10 DIAGNOSIS — Z3A.14 14 WEEKS GESTATION OF PREGNANCY: ICD-10-CM

## 2024-05-10 LAB
ABO GROUP BLD: NORMAL
ALBUMIN SERPL-MCNC: 3.9 G/DL (ref 3.5–5.2)
ALBUMIN/GLOB SERPL: 1.3 G/DL
ALP SERPL-CCNC: 48 U/L (ref 39–117)
ALT SERPL W P-5'-P-CCNC: 19 U/L (ref 1–33)
AMPHET+METHAMPHET UR QL: NEGATIVE
AMPHETAMINES UR QL: NEGATIVE
ANION GAP SERPL CALCULATED.3IONS-SCNC: 13.8 MMOL/L (ref 5–15)
AST SERPL-CCNC: 15 U/L (ref 1–32)
BARBITURATES UR QL SCN: NEGATIVE
BASOPHILS # BLD AUTO: 0.02 10*3/MM3 (ref 0–0.2)
BASOPHILS NFR BLD AUTO: 0.4 % (ref 0–1.5)
BENZODIAZ UR QL SCN: NEGATIVE
BILIRUB SERPL-MCNC: 0.3 MG/DL (ref 0–1.2)
BILIRUB UR QL STRIP: NEGATIVE
BLD GP AB SCN SERPL QL: NEGATIVE
BUN SERPL-MCNC: 7 MG/DL (ref 6–20)
BUN/CREAT SERPL: 10.3 (ref 7–25)
BUPRENORPHINE SERPL-MCNC: NEGATIVE NG/ML
CALCIUM SPEC-SCNC: 9 MG/DL (ref 8.6–10.5)
CANNABINOIDS SERPL QL: NEGATIVE
CHLORIDE SERPL-SCNC: 103 MMOL/L (ref 98–107)
CLARITY UR: ABNORMAL
CO2 SERPL-SCNC: 20.2 MMOL/L (ref 22–29)
COCAINE UR QL: NEGATIVE
COLOR UR: ABNORMAL
CREAT SERPL-MCNC: 0.68 MG/DL (ref 0.57–1)
DEPRECATED RDW RBC AUTO: 39.5 FL (ref 37–54)
EGFRCR SERPLBLD CKD-EPI 2021: 109.6 ML/MIN/1.73
EOSINOPHIL # BLD AUTO: 0.13 10*3/MM3 (ref 0–0.4)
EOSINOPHIL NFR BLD AUTO: 2.7 % (ref 0.3–6.2)
ERYTHROCYTE [DISTWIDTH] IN BLOOD BY AUTOMATED COUNT: 12.5 % (ref 12.3–15.4)
FENTANYL UR-MCNC: NEGATIVE NG/ML
GLOBULIN UR ELPH-MCNC: 2.9 GM/DL
GLUCOSE SERPL-MCNC: 100 MG/DL (ref 65–99)
GLUCOSE UR STRIP-MCNC: NEGATIVE MG/DL
HBA1C MFR BLD: 5.8 % (ref 4.8–5.6)
HBV SURFACE AG SERPL QL IA: NORMAL
HCT VFR BLD AUTO: 32.9 % (ref 34–46.6)
HCV AB SER QL: NORMAL
HGB BLD-MCNC: 11.1 G/DL (ref 12–15.9)
HGB UR QL STRIP.AUTO: NEGATIVE
HIV 1+2 AB+HIV1 P24 AG SERPL QL IA: NORMAL
HOLD SPECIMEN: NORMAL
IMM GRANULOCYTES # BLD AUTO: 0.01 10*3/MM3 (ref 0–0.05)
IMM GRANULOCYTES NFR BLD AUTO: 0.2 % (ref 0–0.5)
KETONES UR QL STRIP: NEGATIVE
LEUKOCYTE ESTERASE UR QL STRIP.AUTO: NEGATIVE
LYMPHOCYTES # BLD AUTO: 1.73 10*3/MM3 (ref 0.7–3.1)
LYMPHOCYTES NFR BLD AUTO: 35.7 % (ref 19.6–45.3)
MCH RBC QN AUTO: 29.8 PG (ref 26.6–33)
MCHC RBC AUTO-ENTMCNC: 33.7 G/DL (ref 31.5–35.7)
MCV RBC AUTO: 88.4 FL (ref 79–97)
METHADONE UR QL SCN: NEGATIVE
MONOCYTES # BLD AUTO: 0.34 10*3/MM3 (ref 0.1–0.9)
MONOCYTES NFR BLD AUTO: 7 % (ref 5–12)
NEUTROPHILS NFR BLD AUTO: 2.62 10*3/MM3 (ref 1.7–7)
NEUTROPHILS NFR BLD AUTO: 54 % (ref 42.7–76)
NITRITE UR QL STRIP: NEGATIVE
NRBC BLD AUTO-RTO: 0 /100 WBC (ref 0–0.2)
OPIATES UR QL: NEGATIVE
OXYCODONE UR QL SCN: NEGATIVE
PCP UR QL SCN: NEGATIVE
PH UR STRIP.AUTO: 5.5 [PH] (ref 5–8)
PLATELET # BLD AUTO: 142 10*3/MM3 (ref 140–450)
PMV BLD AUTO: 11.4 FL (ref 6–12)
POTASSIUM SERPL-SCNC: 3.8 MMOL/L (ref 3.5–5.2)
PROT SERPL-MCNC: 6.8 G/DL (ref 6–8.5)
PROT UR QL STRIP: ABNORMAL
RBC # BLD AUTO: 3.72 10*6/MM3 (ref 3.77–5.28)
RH BLD: POSITIVE
RPR SER QL: NORMAL
SODIUM SERPL-SCNC: 137 MMOL/L (ref 136–145)
SP GR UR STRIP: >1.03 (ref 1–1.03)
TRICYCLICS UR QL SCN: NEGATIVE
TSH SERPL DL<=0.05 MIU/L-ACNC: 2.12 UIU/ML (ref 0.27–4.2)
UROBILINOGEN UR QL STRIP: ABNORMAL
WBC NRBC COR # BLD AUTO: 4.85 10*3/MM3 (ref 3.4–10.8)

## 2024-05-10 PROCEDURE — 86900 BLOOD TYPING SEROLOGIC ABO: CPT

## 2024-05-10 PROCEDURE — G0432 EIA HIV-1/HIV-2 SCREEN: HCPCS

## 2024-05-10 PROCEDURE — 83036 HEMOGLOBIN GLYCOSYLATED A1C: CPT

## 2024-05-10 PROCEDURE — 86850 RBC ANTIBODY SCREEN: CPT

## 2024-05-10 PROCEDURE — 86901 BLOOD TYPING SEROLOGIC RH(D): CPT

## 2024-05-10 PROCEDURE — 86803 HEPATITIS C AB TEST: CPT

## 2024-05-10 PROCEDURE — 86480 TB TEST CELL IMMUN MEASURE: CPT

## 2024-05-10 PROCEDURE — 80050 GENERAL HEALTH PANEL: CPT

## 2024-05-10 PROCEDURE — 81003 URINALYSIS AUTO W/O SCOPE: CPT

## 2024-05-10 PROCEDURE — 86762 RUBELLA ANTIBODY: CPT

## 2024-05-10 PROCEDURE — 86592 SYPHILIS TEST NON-TREP QUAL: CPT

## 2024-05-10 PROCEDURE — 80307 DRUG TEST PRSMV CHEM ANLYZR: CPT

## 2024-05-10 PROCEDURE — 36415 COLL VENOUS BLD VENIPUNCTURE: CPT

## 2024-05-10 PROCEDURE — 87340 HEPATITIS B SURFACE AG IA: CPT

## 2024-05-10 RX ORDER — ASPIRIN 81 MG/1
81 TABLET ORAL DAILY
Qty: 40 TABLET | Refills: 0 | Status: SHIPPED | OUTPATIENT
Start: 2024-05-29 | End: 2024-07-08

## 2024-05-10 RX ORDER — GENTAMICIN SULFATE 3 MG/ML
1 SOLUTION/ DROPS OPHTHALMIC 3 TIMES DAILY
Qty: 15 ML | Refills: 0 | Status: SHIPPED | OUTPATIENT
Start: 2024-05-10

## 2024-05-10 SDOH — SOCIAL STABILITY - SOCIAL INSECURITY: ACCULTURATION DIFFICULTY: Z60.3

## 2024-05-11 LAB — RUBV IGG SERPL IA-ACNC: 12.6 INDEX

## 2024-05-13 PROBLEM — O99.019 MATERNAL ANEMIA IN PREGNANCY, ANTEPARTUM: Status: ACTIVE | Noted: 2024-05-13

## 2024-05-13 RX ORDER — FERROUS SULFATE 325(65) MG
325 TABLET ORAL
Qty: 100 TABLET | Refills: 1 | Status: SHIPPED | OUTPATIENT
Start: 2024-05-13

## 2024-05-13 NOTE — PROGRESS NOTES
Please advise the patient that her labs are generally fine. She is a bit anemic and I have sent a prescription for iron supplement to her pharmacy. Her hemoglobin A1c was a bit high which suggests an increase risk of developing gestational diabetes. We will screen for this as we routinely do at 24 weeks

## 2024-05-14 ENCOUNTER — PATIENT ROUNDING (BHMG ONLY) (OUTPATIENT)
Dept: OBSTETRICS AND GYNECOLOGY | Facility: CLINIC | Age: 44
End: 2024-05-14
Payer: MEDICAID

## 2024-05-14 LAB
GAMMA INTERFERON BACKGROUND BLD IA-ACNC: 0.07 IU/ML
M TB IFN-G BLD-IMP: NEGATIVE
M TB IFN-G CD4+ BCKGRND COR BLD-ACNC: 0.22 IU/ML
M TB IFN-G CD4+CD8+ BCKGRND COR BLD-ACNC: 0.2 IU/ML
MITOGEN IGNF BCKGRD COR BLD-ACNC: >10 IU/ML
SERVICE CMNT-IMP: NORMAL

## 2024-05-14 NOTE — PROGRESS NOTES
A [employer] message has been sent to the patient for PATIENT ROUNDING with [facility].

## 2024-05-22 ENCOUNTER — TELEPHONE (OUTPATIENT)
Dept: OBSTETRICS AND GYNECOLOGY | Facility: CLINIC | Age: 44
End: 2024-05-22
Payer: MEDICAID

## 2024-05-22 NOTE — TELEPHONE ENCOUNTER
Admin Notation - Left voicemail for patient with appointment details for PDC and F/U with provider after. This is of informative nature only.

## 2024-06-19 ENCOUNTER — TELEPHONE (OUTPATIENT)
Dept: OBSTETRICS AND GYNECOLOGY | Facility: CLINIC | Age: 44
End: 2024-06-19
Payer: MEDICAID

## 2024-06-19 NOTE — TELEPHONE ENCOUNTER
Admin Notation - discussed with DAI MENENDEZ and called patient to confirm preferred provider she wished to continue her care with, and to schedule out her remainder appointments. Reached her voicemail, left a brief message regarding and our office phone number.

## 2024-06-19 NOTE — TELEPHONE ENCOUNTER
Admin Notation - called patient to provide her the details of two rescheduled appointments for 6/24/24 and 7/16/24. Reached her voicemail and left a message with our office number. Sent a [email or web address] message also.

## 2024-07-08 ENCOUNTER — TELEPHONE (OUTPATIENT)
Dept: FAMILY MEDICINE CLINIC | Facility: CLINIC | Age: 44
End: 2024-07-08
Payer: MEDICAID

## 2024-07-08 ENCOUNTER — OFFICE VISIT (OUTPATIENT)
Dept: OBSTETRICS AND GYNECOLOGY | Facility: HOSPITAL | Age: 44
End: 2024-07-08
Payer: MEDICAID

## 2024-07-08 ENCOUNTER — HOSPITAL ENCOUNTER (OUTPATIENT)
Dept: WOMENS IMAGING | Facility: HOSPITAL | Age: 44
Discharge: HOME | End: 2024-07-08
Admitting: OBSTETRICS & GYNECOLOGY
Payer: MEDICAID

## 2024-07-08 VITALS — SYSTOLIC BLOOD PRESSURE: 101 MMHG | BODY MASS INDEX: 31.18 KG/M2 | WEIGHT: 205 LBS | DIASTOLIC BLOOD PRESSURE: 58 MMHG

## 2024-07-08 DIAGNOSIS — O09.529 ANTEPARTUM MULTIGRAVIDA OF ADVANCED MATERNAL AGE: Primary | ICD-10-CM

## 2024-07-08 DIAGNOSIS — O09.40 ENCOUNTER FOR SUPERVISION OF HIGH RISK PREGNANCY WITH GRAND MULTIPARITY, ANTEPARTUM: ICD-10-CM

## 2024-07-08 DIAGNOSIS — Z3A.23 23 WEEKS GESTATION OF PREGNANCY: ICD-10-CM

## 2024-07-08 PROCEDURE — 99213 OFFICE O/P EST LOW 20 MIN: CPT | Performed by: OBSTETRICS & GYNECOLOGY

## 2024-07-08 PROCEDURE — 76811 OB US DETAILED SNGL FETUS: CPT | Performed by: OBSTETRICS & GYNECOLOGY

## 2024-07-08 PROCEDURE — 76811 OB US DETAILED SNGL FETUS: CPT

## 2024-07-08 RX ORDER — PRENATAL VIT/IRON FUM/FOLIC AC 27MG-0.8MG
TABLET ORAL DAILY
COMMUNITY

## 2024-07-08 NOTE — PROGRESS NOTES
"    Maternal/Fetal Medicine Consult Note     Name: Manda Schaefer    : 1980     MRN: [id number]     Referring Provider: Cece Carey MD    Chief Complaint  Advanced Maternal Age    Subjective     History of Present Illness:  Manda Schaefer is a 44 y.o.  23w1d who presents today for a fetal anatomic survey.     She denies LOF/VB/ctx's. +FM.     PAM: Estimated Date of Delivery: 11/3/24     ROS:   As noted in HPI.     History reviewed. No pertinent past medical history.   History reviewed. No pertinent surgical history.   OB History          5    Para   4    Term   4            AB        Living   4         SAB        IAB        Ectopic        Molar        Multiple        Live Births   4          Obstetric Comments   FOB #1 for all pregnancies                 Objective     Vital Signs  /58   Wt 93 kg (205 lb)   LMP 2024   Estimated body mass index is 31.18 kg/m² as calculated from the following:    Height as of 24: 172.7 cm (67.99\").    Weight as of this encounter: 93 kg (205 lb).    Physical Exam  Constitutional:       Appearance: Normal appearance. She is normal weight.   HENT:      Head: Normocephalic and atraumatic.   Pulmonary:      Effort: Pulmonary effort is normal.   Musculoskeletal:         General: Normal range of motion.   Neurological:      General: No focal deficit present.      Mental Status: She is alert and oriented to person, place, and time.   Psychiatric:         Mood and Affect: Mood normal.         Behavior: Behavior normal.         Thought Content: Thought content normal.         Judgment: Judgment normal.     Ultrasound Impression:   Vtx, S=D, nl JADIEL, posterior placenta, nl CL, nl anatomy.     Assessment and Plan     Diagnoses and all orders for this visit:    1. Antepartum multigravida of advanced maternal age (Primary)  Assessment & Plan:  Pt declined genetic screening.     Anatomy survey today is within normal limits with all needed views " obtained.      - Follow-up for growth scheduled at 32wks      2. Encounter for supervision of high risk pregnancy with grand multiparity, antepartum    3. 23 weeks gestation of pregnancy         Follow Up  Return in about 9 weeks (around 9/9/2024).    I spent 15 minutes caring for the patient on the day of service. This included: obtaining or reviewing a separately obtained medical history, reviewing patient records, performing a medically appropriate exam and/or evaluation, counseling or educating the patient/family/caregiver, ordering medications, labs, and/or procedures and documenting such in the medical record. This does not include time spent on review and interpretation of other tests such as fetal ultrasound or the performance of other procedures such as amniocentesis or CVS.      Cecile Prince MD  07/08/2024

## 2024-07-08 NOTE — PROGRESS NOTES
Patient denies bleeding, leaking fluid or contractions  NIPT declined  Patients next follow up with Dr. Block's office is today

## 2024-07-08 NOTE — TELEPHONE ENCOUNTER
I explained that we did not place referrals for dentistry. They requested an appointment with Dr Dorantes to discuss oral pain. I have this scheduled.

## 2024-07-08 NOTE — ASSESSMENT & PLAN NOTE
Pt declined genetic screening.     Anatomy survey today is within normal limits with all needed views obtained.      - Follow-up for growth scheduled at 32wks

## 2024-07-08 NOTE — LETTER
"2024     Jose De Jesus Block MD  [address]  [facility] [address]    Patient: Manda Schaefer   YOB: 1980   Date of Visit: 2024       Dear Jose De Jesus Block MD,    Thank you for referring Manda Schaefer to me for evaluation. Below is a copy of my consult note.    If you have questions, please do not hesitate to call me. I look forward to following Manda along with you.         Sincerely,        Cecile Prince MD        CC: No Recipients                    Maternal/Fetal Medicine Consult Note     Name: Manda Schaefer    : 1980     MRN: [id number]     Referring Provider: Cece Carey MD    Chief Complaint  Advanced Maternal Age    Subjective     History of Present Illness:  Manda Schaefer is a 44 y.o.  23w1d who presents today for a fetal anatomic survey.     She denies LOF/VB/ctx's. +FM.     PAM: Estimated Date of Delivery: 11/3/24     ROS:   As noted in HPI.     History reviewed. No pertinent past medical history.   History reviewed. No pertinent surgical history.   OB History          5    Para   4    Term   4            AB        Living   4         SAB        IAB        Ectopic        Molar        Multiple        Live Births   4          Obstetric Comments   FOB #1 for all pregnancies                 Objective     Vital Signs  /58   Wt 93 kg (205 lb)   LMP 2024   Estimated body mass index is 31.18 kg/m² as calculated from the following:    Height as of 24: 172.7 cm (67.99\").    Weight as of this encounter: 93 kg (205 lb).    Physical Exam  Constitutional:       Appearance: Normal appearance. She is normal weight.   HENT:      Head: Normocephalic and atraumatic.   Pulmonary:      Effort: Pulmonary effort is normal.   Musculoskeletal:         General: Normal range of motion.   Neurological:      General: No focal deficit present.      Mental Status: She is alert and oriented to person, place, and time.   Psychiatric:         " Mood and Affect: Mood normal.         Behavior: Behavior normal.         Thought Content: Thought content normal.         Judgment: Judgment normal.     Ultrasound Impression:   Vtx, S=D, nl JADIEL, posterior placenta, nl CL, nl anatomy.     Assessment and Plan     Diagnoses and all orders for this visit:    1. Antepartum multigravida of advanced maternal age (Primary)  Assessment & Plan:  Pt declined genetic screening.     Anatomy survey today is within normal limits with all needed views obtained.      - Follow-up for growth scheduled at 32wks      2. Encounter for supervision of high risk pregnancy with grand multiparity, antepartum    3. 23 weeks gestation of pregnancy         Follow Up  Return in about 9 weeks (around 9/9/2024).    I spent 15 minutes caring for the patient on the day of service. This included: obtaining or reviewing a separately obtained medical history, reviewing patient records, performing a medically appropriate exam and/or evaluation, counseling or educating the patient/family/caregiver, ordering medications, labs, and/or procedures and documenting such in the medical record. This does not include time spent on review and interpretation of other tests such as fetal ultrasound or the performance of other procedures such as amniocentesis or CVS.      Cecile Prince MD  07/08/2024

## 2024-07-08 NOTE — TELEPHONE ENCOUNTER
Caller: Javi Schaefer    Relationship: Emergency Contact    Best call back number: [phone]     What is the medical concern/diagnosis: GUM PROBLEMS    What specialty or service is being requested: DENTIST      Any additional details: PATIENT WANTS DR HALL TO REFER HER TO A DENTIST

## 2024-07-10 ENCOUNTER — OFFICE VISIT (OUTPATIENT)
Dept: FAMILY MEDICINE CLINIC | Facility: CLINIC | Age: 44
End: 2024-07-10
Payer: MEDICAID

## 2024-07-10 VITALS
DIASTOLIC BLOOD PRESSURE: 62 MMHG | OXYGEN SATURATION: 99 % | BODY MASS INDEX: 30.83 KG/M2 | HEART RATE: 76 BPM | WEIGHT: 203.4 LBS | HEIGHT: 68 IN | SYSTOLIC BLOOD PRESSURE: 110 MMHG

## 2024-07-10 DIAGNOSIS — K08.89 TOOTH PAIN: Primary | ICD-10-CM

## 2024-07-10 PROCEDURE — 1126F AMNT PAIN NOTED NONE PRSNT: CPT | Performed by: INTERNAL MEDICINE

## 2024-07-10 PROCEDURE — 99214 OFFICE O/P EST MOD 30 MIN: CPT | Performed by: INTERNAL MEDICINE

## 2024-07-10 RX ORDER — AMOXICILLIN 500 MG/1
500 CAPSULE ORAL 3 TIMES DAILY
Qty: 30 CAPSULE | Refills: 0 | Status: SHIPPED | OUTPATIENT
Start: 2024-07-10

## 2024-07-10 NOTE — PROGRESS NOTES
"Manda Schaefer  1980  [id number]  Patient Care Team:  Pramod Dorantes MD as PCP - General (Internal Medicine)    Manda Schaefer is a 44 y.o. female here today for follow up.     This patient is accompanied by their Answers submitted by the patient for this visit:  Primary Reason for Visit (Submitted on 7/9/2024)  What is the primary reason for your visit?: Extremity Pain  Lower Extremity Injury Questionnaire (Submitted on 7/9/2024)  Chief Complaint: Extremity pain  Injury: No  Pain location: neck  Pain quality: aching  Pain - numeric: 9/10  Progression since onset: unchanged  upper extremity swelling: Yes  Additional information: Upper gums left side of my mouth   who contributes to the history of their care.    Chief Complaint:    Chief Complaint   Patient presents with    Oral Pain        History of Present Illness:  I have reviewed and/or updated the patient's past medical, past surgical, family, social history, problem list and allergies as appropriate.     Reports gums pain upper right side for 3 days. No bleeding. Tylenol has helped. No drainage . Pain with chewing. Currently looking for dentist.  She has history of tooth fracture back in [place] prior to moving here.  There is been no fevers or chills.  No drainage.      Review of Systems   Constitutional: Negative.    HENT:  Positive for dental problem.    Respiratory: Negative.     Cardiovascular: Negative.        Vitals:    07/10/24 1325   BP: 110/62   Pulse: 76   SpO2: 99%   Weight: 92.3 kg (203 lb 6.4 oz)   Height: 172.7 cm (67.99\")     Body mass index is 30.93 kg/m².    Physical Exam  Vitals and nursing note reviewed.   Constitutional:       General: She is not in acute distress.     Appearance: She is well-developed. She is not diaphoretic.   HENT:      Head: Normocephalic and atraumatic.      Right Ear: External ear normal.      Left Ear: External ear normal.      Mouth/Throat:      Pharynx: No oropharyngeal exudate.        Comments: Tooth is " fractured at the gumline.  There is apical edema.  No drainage.  Very tender to palpate.  Eyes:      General: No scleral icterus.        Right eye: No discharge.      Conjunctiva/sclera: Conjunctivae normal.   Neck:      Thyroid: No thyromegaly.      Vascular: No JVD.      Trachea: No tracheal deviation.   Cardiovascular:      Rate and Rhythm: Normal rate and regular rhythm.      Heart sounds: Normal heart sounds.      Comments: PMI nondisplaced  Pulmonary:      Effort: Pulmonary effort is normal.      Breath sounds: Normal breath sounds. No wheezing or rales.   Abdominal:      General: Bowel sounds are normal.      Palpations: Abdomen is soft.      Tenderness: There is no abdominal tenderness. There is no guarding or rebound.   Musculoskeletal:      Cervical back: Normal range of motion and neck supple.   Lymphadenopathy:      Cervical: No cervical adenopathy.   Skin:     General: Skin is warm and dry.      Capillary Refill: Capillary refill takes less than 2 seconds.      Coloration: Skin is not pale.      Findings: No rash.   Neurological:      Mental Status: She is alert and oriented to person, place, and time.      Motor: No abnormal muscle tone.      Coordination: Coordination normal.   Psychiatric:         Judgment: Judgment normal.         Procedures    Results Review:    None    Assessment/Plan:    Problem List Items Addressed This Visit    None  Visit Diagnoses       Tooth pain    -  Primary    Relevant Orders    Ambulatory Referral to Dentistry (Completed)        I suspect she has an abscessed tooth at the fractured tooth.  I recommended she see dentistry ASAP.  Placed her on amoxicillin 100 mg p.o. 3 times daily..  Given the fact that she is pregnant only Tylenol for pain.    Plan of care reviewed with patient at the conclusion of today's visit. Education was provided regarding diagnosis and management.  Patient verbalizes understanding of and agreement with management plan.    No follow-ups on  file.    Pramod Dorantes MD      Please note than portions of this note were completed [facility] a Voice Recognition Program

## 2024-07-15 DIAGNOSIS — O09.529 ANTEPARTUM MULTIGRAVIDA OF ADVANCED MATERNAL AGE: Primary | ICD-10-CM

## 2024-07-16 ENCOUNTER — TELEPHONE (OUTPATIENT)
Dept: OBSTETRICS AND GYNECOLOGY | Facility: CLINIC | Age: 44
End: 2024-07-16

## 2024-07-16 NOTE — TELEPHONE ENCOUNTER
ZANE     Received call from patient and spouse, follow up regarding transfer of care to [address]. [facility] spoke with someone from [address] and are currently awaiting availability for a female provider. I called [address] and spoke with Thuy, whom the call was transferred to and she spoke with the patient as well as her spouse.

## 2024-07-17 ENCOUNTER — TELEPHONE (OUTPATIENT)
Dept: OBSTETRICS AND GYNECOLOGY | Facility: CLINIC | Age: 44
End: 2024-07-17
Payer: MEDICAID

## 2024-08-23 ENCOUNTER — OFFICE VISIT (OUTPATIENT)
Dept: FAMILY MEDICINE CLINIC | Facility: CLINIC | Age: 44
End: 2024-08-23
Payer: MEDICAID

## 2024-08-23 VITALS
DIASTOLIC BLOOD PRESSURE: 68 MMHG | OXYGEN SATURATION: 100 % | BODY MASS INDEX: 30.52 KG/M2 | WEIGHT: 201.4 LBS | HEART RATE: 80 BPM | SYSTOLIC BLOOD PRESSURE: 112 MMHG | HEIGHT: 68 IN

## 2024-08-23 DIAGNOSIS — K64.8 INTERNAL HEMORRHOID: Primary | ICD-10-CM

## 2024-08-23 PROCEDURE — 1126F AMNT PAIN NOTED NONE PRSNT: CPT | Performed by: INTERNAL MEDICINE

## 2024-08-23 PROCEDURE — 99214 OFFICE O/P EST MOD 30 MIN: CPT | Performed by: INTERNAL MEDICINE

## 2024-08-23 RX ORDER — HYDROCORTISONE 25 MG/G
CREAM TOPICAL 2 TIMES DAILY
Qty: 45 G | Refills: 0 | Status: SHIPPED | OUTPATIENT
Start: 2024-08-23

## 2024-08-23 RX ORDER — HYDROCORTISONE ACETATE 25 MG/1
25 SUPPOSITORY RECTAL 2 TIMES DAILY
Qty: 20 EACH | Refills: 1 | Status: SHIPPED | OUTPATIENT
Start: 2024-08-23

## 2024-08-23 NOTE — PROGRESS NOTES
"Manda Schaefer  1980  [id number]  Patient Care Team:  Pramod Dorantes MD as PCP - General (Internal Medicine)    Manda Schaefer is a 44 y.o. female here today for follow up.     This patient is accompanied by their  who contributes to the history of their care.    Chief Complaint:    Chief Complaint   Patient presents with    Hemorrhoids        History of Present Illness:  I have reviewed and/or updated the patient's past medical, past surgical, family, social history, problem list and allergies as appropriate.     She is pregnant, recently referred to maternal-fetal medicine  established will be seen at [facility]. She has noted protrusion of a hemorrhoid 3 days. Has tried equate suppository without improvement.  She is not constipated. No bleeding or itching. Pain can be up to 4 at times. Has been sitting more with pregnancy.        Review of Systems   Constitutional: Negative.    HENT: Negative.     Respiratory: Negative.     Gastrointestinal:  Negative for anal bleeding, blood in stool, nausea and vomiting.        Irritation mild pain protruding hemorrhoid       Vitals:    08/23/24 0857   BP: 112/68   Pulse: 80   SpO2: 100%   Weight: 91.4 kg (201 lb 6.4 oz)   Height: 172.7 cm (67.99\")     Body mass index is 30.63 kg/m².    Physical Exam    Procedures    Results Review:    I reviewed the patient's new clinical results.    Assessment/Plan:    Problem List Items Addressed This Visit    None  Visit Diagnoses       Internal hemorrhoid    -  Primary    Relevant Medications    hydrocortisone (ANUSOL-HC) 25 MG suppository    Hydrocortisone, Perianal, (Anusol-HC) 2.5 % rectal cream    Other Relevant Orders    Cushioned Chair Preinflated    Ambulatory Referral to Colorectal Surgery            Plan of care reviewed with patient at the conclusion of today's visit. Education was provided regarding diagnosis and management.  Patient verbalizes understanding of and agreement with management plan.    Return if " symptoms worsen or fail to improve.  Otherwise follow-up with maternal-fetal medicine    Pramod Dorantes MD      Please note than portions of this note were completed wt a Voice Recognition Program

## 2024-08-26 RX ORDER — AMOXICILLIN 500 MG/1
500 CAPSULE ORAL 3 TIMES DAILY
Qty: 30 CAPSULE | Refills: 0 | OUTPATIENT
Start: 2024-08-26

## 2024-11-18 ENCOUNTER — PATIENT MESSAGE (OUTPATIENT)
Dept: OBSTETRICS AND GYNECOLOGY | Facility: CLINIC | Age: 44
End: 2024-11-18
Payer: MEDICAID

## 2024-11-18 NOTE — TELEPHONE ENCOUNTER
"\"I have never seen this patient nor can I do circumcision... I believe since this baby is not a  she would have to go to urology but I don't know that for sure. Her pediatrician would be better able to offer suggestions.\" Viji Lam   Pt informed and stated understanding.     "

## 2025-08-21 ENCOUNTER — OFFICE VISIT (OUTPATIENT)
Age: 45
End: 2025-08-21
Payer: MEDICAID

## 2025-08-21 ENCOUNTER — LAB (OUTPATIENT)
Age: 45
End: 2025-08-21
Payer: MEDICAID

## 2025-08-21 VITALS
SYSTOLIC BLOOD PRESSURE: 98 MMHG | DIASTOLIC BLOOD PRESSURE: 60 MMHG | BODY MASS INDEX: 31.34 KG/M2 | WEIGHT: 206.8 LBS | HEART RATE: 66 BPM | HEIGHT: 68 IN | OXYGEN SATURATION: 99 %

## 2025-08-21 DIAGNOSIS — Z00.00 PREVENTATIVE HEALTH CARE: ICD-10-CM

## 2025-08-21 DIAGNOSIS — K21.9 GASTROESOPHAGEAL REFLUX DISEASE WITHOUT ESOPHAGITIS: ICD-10-CM

## 2025-08-21 DIAGNOSIS — Z00.00 PREVENTATIVE HEALTH CARE: Primary | ICD-10-CM

## 2025-08-21 LAB — HBA1C MFR BLD: 6.2 % (ref 4.8–5.6)

## 2025-08-21 RX ORDER — PANTOPRAZOLE SODIUM 20 MG/1
20 TABLET, DELAYED RELEASE ORAL DAILY
Qty: 90 TABLET | Refills: 3 | Status: SHIPPED | OUTPATIENT
Start: 2025-08-21

## 2025-08-22 LAB
ALBUMIN SERPL-MCNC: 4.1 G/DL (ref 3.5–5.2)
ALBUMIN/GLOB SERPL: 1.2 G/DL
ALP SERPL-CCNC: 68 U/L (ref 39–117)
ALT SERPL W P-5'-P-CCNC: 15 U/L (ref 1–33)
ANION GAP SERPL CALCULATED.3IONS-SCNC: 12.7 MMOL/L (ref 5–15)
AST SERPL-CCNC: 19 U/L (ref 1–32)
BILIRUB SERPL-MCNC: 0.4 MG/DL (ref 0–1.2)
BUN SERPL-MCNC: 11 MG/DL (ref 6–20)
BUN/CREAT SERPL: 12.2 (ref 7–25)
CALCIUM SPEC-SCNC: 9.3 MG/DL (ref 8.6–10.5)
CHLORIDE SERPL-SCNC: 105 MMOL/L (ref 98–107)
CHOLEST SERPL-MCNC: 169 MG/DL (ref 0–200)
CO2 SERPL-SCNC: 21.3 MMOL/L (ref 22–29)
CREAT SERPL-MCNC: 0.9 MG/DL (ref 0.57–1)
DEPRECATED RDW RBC AUTO: 40 FL (ref 37–54)
EGFRCR SERPLBLD CKD-EPI 2021: 80 ML/MIN/1.73
ERYTHROCYTE [DISTWIDTH] IN BLOOD BY AUTOMATED COUNT: 12.6 % (ref 12.3–15.4)
GLOBULIN UR ELPH-MCNC: 3.5 GM/DL
GLUCOSE SERPL-MCNC: 101 MG/DL (ref 65–99)
HCT VFR BLD AUTO: 39.3 % (ref 34–46.6)
HDLC SERPL-MCNC: 41 MG/DL (ref 40–60)
HGB BLD-MCNC: 12.8 G/DL (ref 12–15.9)
LDLC SERPL CALC-MCNC: 115 MG/DL (ref 0–100)
LDLC/HDLC SERPL: 2.79 {RATIO}
MCH RBC QN AUTO: 28.4 PG (ref 26.6–33)
MCHC RBC AUTO-ENTMCNC: 32.6 G/DL (ref 31.5–35.7)
MCV RBC AUTO: 87.3 FL (ref 79–97)
PLATELET # BLD AUTO: 200 10*3/MM3 (ref 140–450)
PMV BLD AUTO: 11.8 FL (ref 6–12)
POTASSIUM SERPL-SCNC: 4.1 MMOL/L (ref 3.5–5.2)
PROT SERPL-MCNC: 7.6 G/DL (ref 6–8.5)
RBC # BLD AUTO: 4.5 10*6/MM3 (ref 3.77–5.28)
SODIUM SERPL-SCNC: 139 MMOL/L (ref 136–145)
TRIGL SERPL-MCNC: 69 MG/DL (ref 0–150)
VLDLC SERPL-MCNC: 13 MG/DL (ref 5–40)
WBC NRBC COR # BLD AUTO: 4.93 10*3/MM3 (ref 3.4–10.8)

## 2025-08-25 DIAGNOSIS — M25.561 ACUTE PAIN OF RIGHT KNEE: Primary | ICD-10-CM
